# Patient Record
Sex: MALE | Race: WHITE | Employment: UNEMPLOYED | URBAN - NONMETROPOLITAN AREA
[De-identification: names, ages, dates, MRNs, and addresses within clinical notes are randomized per-mention and may not be internally consistent; named-entity substitution may affect disease eponyms.]

---

## 2022-08-03 ENCOUNTER — HOSPITAL ENCOUNTER (EMERGENCY)
Age: 82
Discharge: HOME | End: 2022-08-03
Payer: MEDICARE

## 2022-08-03 VITALS
SYSTOLIC BLOOD PRESSURE: 133 MMHG | HEART RATE: 76 BPM | RESPIRATION RATE: 18 BRPM | OXYGEN SATURATION: 92 % | TEMPERATURE: 98.4 F | DIASTOLIC BLOOD PRESSURE: 57 MMHG

## 2022-08-03 DIAGNOSIS — U07.1 COVID-19: Primary | ICD-10-CM

## 2022-08-03 LAB — SARS-COV-2, NAAT: DETECTED

## 2022-08-03 PROCEDURE — 87635 SARS-COV-2 COVID-19 AMP PRB: CPT

## 2022-08-03 PROCEDURE — 99283 EMERGENCY DEPT VISIT LOW MDM: CPT

## 2022-08-03 ASSESSMENT — ENCOUNTER SYMPTOMS
COUGH: 0
VOMITING: 0
DIARRHEA: 0
SHORTNESS OF BREATH: 0
NAUSEA: 0
BACK PAIN: 0
ABDOMINAL PAIN: 0

## 2022-08-03 NOTE — ED PROVIDER NOTES
[facility] EMERGENCY DEPT  EMERGENCY DEPARTMENT ENCOUNTER      Pt Name: Abad Bucio  MRN: [id number]  [place] 1940  Date of evaluation: 8/3/2022  Provider: IRENE Alexander CNP    CHIEF COMPLAINT       Chief Complaint   Patient presents with    Positive For Covid-19     + home test, wants retested and prescription for covid pills           HISTORY OF PRESENT ILLNESS   (Location/Symptom, Timing/Onset, Context/Setting, Quality, Duration, Modifying Factors, Severity)  Note limiting factors. Abad Bucio is a [de-identified] y.o. male who presents to the emergency department with concern for covid after home test positive. He is on day 3 of symptoms. He is interested in paxlovid. Eating and drinking normally. Voiding and stooling normally. Unsure of home meds and filled at multiple pharmacies including [facility] [employer]. Lives in [address] [place], [place] [address]Chinmay HINOJOSA    Nursing Notes were reviewed. REVIEW OF SYSTEMS    (2-9 systems for level 4, 10 or more for level 5)     Review of Systems   Constitutional:  Positive for fatigue. Negative for chills and fever. HENT:  Positive for congestion. Respiratory:  Negative for cough and shortness of breath. Cardiovascular:  Negative for chest pain, palpitations and leg swelling. Gastrointestinal:  Negative for abdominal pain, diarrhea, nausea and vomiting. Genitourinary:  Negative for dysuria, flank pain, frequency and urgency. Musculoskeletal:  Negative for back pain and neck pain. Skin:  Negative for rash. Neurological:  Negative for dizziness, syncope, weakness, light-headedness and headaches. Except as noted above the remainder of the review of systems was reviewed and negative. PAST MEDICAL HISTORY   No past medical history on file. SURGICAL HISTORY     No past surgical history on file. CURRENT MEDICATIONS       Previous Medications    No medications on file       ALLERGIES     Patient has no known allergies. FAMILY HISTORY     No family history on file. SOCIAL HISTORY       Social History     Socioeconomic History    Marital status: Single       SCREENINGS         [place]vell Coma Scale  Eye Opening: Spontaneous  Best Verbal Response: Oriented  Best Motor Response: Obeys commands  [place]vell Coma Scale Score: 15                     CIWA Assessment  BP: (!) 133/57  Heart Rate: 76                 PHYSICAL EXAM    (up to 7 for level 4, 8 or more for level 5)     ED Triage Vitals [08/03/22 1318]   BP Temp Temp src Heart Rate Resp SpO2 Height Weight   (!) 133/57 98.4 °F (36.9 °C) -- 76 18 92 % -- --       Physical Exam  Vitals reviewed. Constitutional:       General: He is not in acute distress. Appearance: Normal appearance. He is not ill-appearing, toxic-appearing or diaphoretic. HENT:      Head: Normocephalic and atraumatic. Right Ear: Tympanic membrane, ear canal and external ear normal.      Left Ear: Tympanic membrane, ear canal and external ear normal.      Nose: Congestion present. Mouth/Throat:      Mouth: Mucous membranes are moist.   Eyes:      Extraocular Movements: Extraocular movements intact. Conjunctiva/sclera: Conjunctivae normal.      Pupils: Pupils are equal, round, and reactive to light. Cardiovascular:      Rate and Rhythm: Normal rate and regular rhythm. Pulses: Normal pulses. Heart sounds: Normal heart sounds. Pulmonary:      Effort: Pulmonary effort is normal.      Breath sounds: Normal breath sounds. Abdominal:      General: Bowel sounds are normal. There is no distension. Palpations: Abdomen is soft. Tenderness: There is no abdominal tenderness. There is no right CVA tenderness, left CVA tenderness or guarding. Musculoskeletal:         General: Normal range of motion. Cervical back: Neck supple. No tenderness. Skin:     General: Skin is warm and dry. Capillary Refill: Capillary refill takes less than 2 seconds. Neurological:      General: No focal deficit present.       Mental Status: He is alert and oriented to person, place, and time. DIAGNOSTIC RESULTS     EKG: All EKG's are interpreted by the Emergency Department Physician who either signs or Co-signs this chart in the absence of a cardiologist.        RADIOLOGY:   Non-plain film images such as CT, Ultrasound and MRI are read by the radiologist. Plain radiographic images are visualized and preliminarily interpreted by the emergency physician with the below findings:        Interpretation per the Radiologist below, if available at the time of this note:    No orders to display         ED BEDSIDE ULTRASOUND:   Performed by ED Physician - none    LABS:  Labs Reviewed   COVID-19, RAPID - Abnormal; Notable for the following components:       Result Value    SARS-CoV-2, NAAT DETECTED (*)     All other components within normal limits    Narrative:     [address] ,  Results called to Zara Marie RN ER, 08/03/2022 13:36, by [facility]       All other labs were within normal range or not returned as of this dictation. EMERGENCY DEPARTMENT COURSE and DIFFERENTIAL DIAGNOSIS/MDM:   Vitals:    Vitals:    08/03/22 1318   BP: (!) 133/57   Pulse: 76   Resp: 18   Temp: 98.4 °F (36.9 °C)   SpO2: 92%           MDM        REASSESSMENT      Hemodynamically stable and well appearing. Counseled that without an idea of home medications it is not safe to prescribe paxlovid today as there are many drug drug interactions. I have encouraged him to call PCP today or tomorrow to verify meds with them and see if he is a candidate for paxlovid. He is agreeable to this. CRITICAL CARE TIME       CONSULTS:  None    PROCEDURES:  Unless otherwise noted below, none     Procedures         FINAL IMPRESSION      1. COVID-19          DISPOSITION/PLAN   DISPOSITION Decision To Discharge 08/03/2022 02:34:57 PM      PATIENT REFERRED TO:  [address]   [address] [place] [id number] [phone]  Call in 1 day      DISCHARGE MEDICATIONS:  New

## 2022-12-09 ENCOUNTER — APPOINTMENT (OUTPATIENT)
Dept: GENERAL RADIOLOGY | Age: 82
End: 2022-12-09
Payer: OTHER GOVERNMENT

## 2022-12-09 ENCOUNTER — HOSPITAL ENCOUNTER (EMERGENCY)
Age: 82
Discharge: HOME | End: 2022-12-09
Attending: EMERGENCY MEDICINE
Payer: OTHER GOVERNMENT

## 2022-12-09 ENCOUNTER — APPOINTMENT (OUTPATIENT)
Dept: CT IMAGING | Age: 82
End: 2022-12-09
Payer: OTHER GOVERNMENT

## 2022-12-09 VITALS
RESPIRATION RATE: 18 BRPM | HEIGHT: 69 IN | DIASTOLIC BLOOD PRESSURE: 61 MMHG | TEMPERATURE: 98 F | BODY MASS INDEX: 30.36 KG/M2 | SYSTOLIC BLOOD PRESSURE: 138 MMHG | HEART RATE: 60 BPM | OXYGEN SATURATION: 95 % | WEIGHT: 205 LBS

## 2022-12-09 DIAGNOSIS — E11.649 TYPE 2 DIABETES MELLITUS WITH HYPOGLYCEMIA WITHOUT COMA, WITH LONG-TERM CURRENT USE OF INSULIN (HCC): ICD-10-CM
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
## 2022-12-09 DIAGNOSIS — Z79.4 TYPE 2 DIABETES MELLITUS WITH HYPOGLYCEMIA WITHOUT COMA, WITH LONG-TERM CURRENT USE OF INSULIN (HCC): ICD-10-CM

## 2022-12-09 DIAGNOSIS — R29.898 WEAKNESS OF BOTH LOWER EXTREMITIES: ICD-10-CM

## 2022-12-09 DIAGNOSIS — M54.50 ACUTE EXACERBATION OF CHRONIC LOW BACK PAIN: ICD-10-CM

## 2022-12-09 DIAGNOSIS — S09.90XA CLOSED HEAD INJURY, INITIAL ENCOUNTER: ICD-10-CM

## 2022-12-09 DIAGNOSIS — G89.29 ACUTE EXACERBATION OF CHRONIC LOW BACK PAIN: ICD-10-CM

## 2022-12-09 LAB
ALBUMIN SERPL-MCNC: 4 G/DL (ref 3.5–5.2)
ALP BLD-CCNC: 90 U/L (ref 40–130)
ALT SERPL-CCNC: 26 U/L (ref 5–41)
ANION GAP SERPL CALCULATED.3IONS-SCNC: 10 MMOL/L (ref 7–19)
AST SERPL-CCNC: 26 U/L (ref 5–40)
BASOPHILS ABSOLUTE: 0 K/UL (ref 0–0.2)
BASOPHILS RELATIVE PERCENT: 0.4 % (ref 0–1)
BILIRUB SERPL-MCNC: 0.3 MG/DL (ref 0.2–1.2)
BUN BLDV-MCNC: 21 MG/DL (ref 8–23)
CALCIUM SERPL-MCNC: 9.2 MG/DL (ref 8.8–10.2)
CHLORIDE BLD-SCNC: 100 MMOL/L (ref 98–111)
CO2: 26 MMOL/L (ref 22–29)
CREAT SERPL-MCNC: 0.8 MG/DL (ref 0.5–1.2)
EOSINOPHILS ABSOLUTE: 0.2 K/UL (ref 0–0.6)
EOSINOPHILS RELATIVE PERCENT: 2 % (ref 0–5)
GFR SERPL CREATININE-BSD FRML MDRD: >60 ML/MIN/{1.73_M2}
GLUCOSE BLD-MCNC: 54 MG/DL (ref 74–109)
GLUCOSE BLD-MCNC: 66 MG/DL (ref 70–99)
HCT VFR BLD CALC: 44 % (ref 42–52)
HEMOGLOBIN: 14.3 G/DL (ref 14–18)
IMMATURE GRANULOCYTES #: 0.1 K/UL
LYMPHOCYTES ABSOLUTE: 1.1 K/UL (ref 1.1–4.5)
LYMPHOCYTES RELATIVE PERCENT: 11 % (ref 20–40)
MCH RBC QN AUTO: 28.7 PG (ref 27–31)
MCHC RBC AUTO-ENTMCNC: 32.5 G/DL (ref 33–37)
MCV RBC AUTO: 88.2 FL (ref 80–94)
MONOCYTES ABSOLUTE: 1 K/UL (ref 0–0.9)
MONOCYTES RELATIVE PERCENT: 10.1 % (ref 0–10)
NEUTROPHILS ABSOLUTE: 7.7 K/UL (ref 1.5–7.5)
NEUTROPHILS RELATIVE PERCENT: 75.5 % (ref 50–65)
PDW BLD-RTO: 14.5 % (ref 11.5–14.5)
PERFORMED ON: ABNORMAL
PLATELET # BLD: 200 K/UL (ref 130–400)
PMV BLD AUTO: 10.3 FL (ref 9.4–12.4)
POTASSIUM SERPL-SCNC: 4 MMOL/L (ref 3.5–5)
RBC # BLD: 4.99 M/UL (ref 4.7–6.1)
SODIUM BLD-SCNC: 136 MMOL/L (ref 136–145)
TOTAL PROTEIN: 6.6 G/DL (ref 6.6–8.7)
WBC # BLD: 10.2 K/UL (ref 4.8–10.8)

## 2022-12-09 PROCEDURE — 80053 COMPREHEN METABOLIC PANEL: CPT

## 2022-12-09 PROCEDURE — 72131 CT LUMBAR SPINE W/O DYE: CPT

## 2022-12-09 PROCEDURE — 93005 ELECTROCARDIOGRAM TRACING: CPT | Performed by: EMERGENCY MEDICINE

## 2022-12-09 PROCEDURE — 85025 COMPLETE CBC W/AUTO DIFF WBC: CPT

## 2022-12-09 PROCEDURE — 82947 ASSAY GLUCOSE BLOOD QUANT: CPT

## 2022-12-09 PROCEDURE — 70450 CT HEAD/BRAIN W/O DYE: CPT

## 2022-12-09 PROCEDURE — 72125 CT NECK SPINE W/O DYE: CPT

## 2022-12-09 PROCEDURE — 36415 COLL VENOUS BLD VENIPUNCTURE: CPT

## 2022-12-09 PROCEDURE — 99285 EMERGENCY DEPT VISIT HI MDM: CPT

## 2022-12-09 PROCEDURE — 71045 X-RAY EXAM CHEST 1 VIEW: CPT

## 2022-12-09 PROCEDURE — 72192 CT PELVIS W/O DYE: CPT

## 2022-12-09 RX ORDER — HYDROCODONE BITARTRATE AND ACETAMINOPHEN 5; 325 MG/1; MG/1
1 TABLET ORAL EVERY 6 HOURS PRN
Qty: 10 TABLET | Refills: 0 | Status: SHIPPED | OUTPATIENT
Start: 2022-12-09 | End: 2022-12-12

## 2022-12-09 ASSESSMENT — ENCOUNTER SYMPTOMS
DIARRHEA: 0
VOICE CHANGE: 0
PHOTOPHOBIA: 0
COUGH: 0
SHORTNESS OF BREATH: 0
EYE REDNESS: 0
ABDOMINAL PAIN: 0
BACK PAIN: 0
BACK PAIN: 1
RHINORRHEA: 0
APNEA: 0
EYE ITCHING: 0
EYE DISCHARGE: 0
EYE PAIN: 0
COLOR CHANGE: 0
VOMITING: 0

## 2022-12-09 ASSESSMENT — PAIN SCALES - GENERAL: PAINLEVEL_OUTOF10: 5

## 2022-12-09 ASSESSMENT — PAIN DESCRIPTION - DESCRIPTORS: DESCRIPTORS: THROBBING

## 2022-12-09 ASSESSMENT — PAIN DESCRIPTION - LOCATION: LOCATION: KNEE

## 2022-12-09 ASSESSMENT — PAIN - FUNCTIONAL ASSESSMENT: PAIN_FUNCTIONAL_ASSESSMENT: 0-10

## 2022-12-09 NOTE — ED NOTES
Patient given orange juice and peanut butter crackers for snack, will recheck BG in about 30min     Mikki Siegel RN  12/09/22 4054

## 2022-12-09 NOTE — DISCHARGE INSTR - COC
Continuity of Care Form    Patient Name: Grady Edgar   :  1940  MRN:  [id number]    Admit date:  2022  Discharge date:  ***    Code Status Order: No Order   Advance Directives:     Admitting Physician:  No admitting provider for patient encounter. PCP: No primary care provider on file. Discharging Nurse: [facility] Unit/Room#:   Discharging Unit Phone Number: ***    Emergency Contact:   No emergency contact information on file. Past Surgical History:  Past Surgical History:   Procedure Laterality Date    ROTATOR CUFF REPAIR         Immunization History:   Immunization History   Administered Date(s) Administered    COVID-19, MODERNA BLUE border, Primary or Immunocompromised, (age 12y+), IM, 100 mcg/0.5mL 2021, 02/15/2021       Active Problems: There is no problem list on file for this patient.       Isolation/Infection:   Isolation            No Isolation          Patient Infection Status       Infection Onset Added Last Indicated Last Indicated By Review Planned Expiration Resolved Resolved By    None active    Resolved    COVID-19 22 COVID-19, Rapid   22     COVID-19 (Rule Out) 22 COVID-19, Rapid (Ordered)   22 Rule-Out Test Resulted            Nurse Assessment:  Last Vital Signs: BP (!) 118/44   Pulse 59   Temp 97.2 °F (36.2 °C) (Temporal)   Resp 19   Ht 5' 9\" (1.753 m)   Wt 205 lb (93 kg)   SpO2 90%   BMI 30.27 kg/m²     Last documented pain score (0-10 scale): Pain Level: 5  Last Weight:   Wt Readings from Last 1 Encounters:   22 205 lb (93 kg)     Mental Status:  {IP PT MENTAL STATUS:}    IV Access:  {[facility] IV ACCESS:736273405}    Nursing Mobility/ADLs:  Walking   {P DME UXCX:729992986}  Transfer  {CHP DME SBTW:824179146}  Bathing  {CHP DME EAOY:349043530}  Dressing  {CHP DME WTRK:639205596}  Toileting  {CHP DME ZNZY:107710722}  Feeding  {CHP DME SQRX:015884457}  Med Admin  {P DME GTOD:853685719}  Med Delivery   [address] FER MED Delivery:960012856}    Wound Care Documentation and Therapy:        Elimination:  Continence: Bowel: {YES / AM:50671}  Bladder: {YES / JQ:24360}  Urinary Catheter: {Urinary Catheter:119450829}   Colostomy/Ileostomy/Ileal Conduit: {YES / ME:32672}       Date of Last BM: ***  No intake or output data in the 24 hours ending 22 1503  No intake/output data recorded.     Safety Concerns:     [address] FER Safety Concerns:802136840}    Impairments/Disabilities:      [address] FER Impairments/Disabilities:072907623}    Nutrition Therapy:  Current Nutrition Therapy:   [address] FER Diet List:071699138}    Routes of Feeding: {CHP DME Other Feedings:763244653}  Liquids: {Slp liquid thickness:53320}  Daily Fluid Restriction: {CHP DME Yes amt example:414971350}  Last Modified Barium Swallow with Video (Video Swallowing Test): {Done Not Done UUUO:864971770}    Treatments at the Time of Hospital Discharge:   Respiratory Treatments: ***  Oxygen Therapy:  {Therapy; copd oxygen:93907}  Ventilator:    {[facility] Vent XDXB:794943774}    Rehab Therapies: {THERAPEUTIC INTERVENTION:1217359753}  Weight Bearing Status/Restrictions: [address]  Weight Bearin}  Other Medical Equipment (for information only, NOT a DME order):  {EQUIPMENT:563780325}  Other Treatments: ***    Patient's personal belongings (please select all that are sent with patient):  {[facility] DME Belongings:164602785}    RN SIGNATURE:  {Esignature:433720177}    CASE MANAGEMENT/SOCIAL WORK SECTION    Inpatient Status Date: ***    Readmission Risk Assessment Score:  Readmission Risk              Risk of Unplanned Readmission:  0           Discharging to Facility/ Agency   Name:   Address:  Phone:  Fax:    Dialysis Facility (if applicable)   Name:  Address:  Dialysis Schedule:  Phone:  Fax:    / signature: {Esignature:764991855}    PHYSICIAN SECTION    Prognosis: {Prognosis:5500452323}    Condition at Discharge: [address] Patient Condition:565036793}    Rehab Potential (if transferring to Rehab): {Prognosis:2763326005}    Recommended Labs or Other Treatments After Discharge: ***    Physician Certification: I certify the above information and transfer of [place] Friday  is necessary for the continuing treatment of the diagnosis listed and that he requires {Admit to Appropriate Level of Care:07417} for {GREATER/LESS:412778372} 30 days.      Update Admission H&P: {CHP DME Changes in BCVYK:117080829}    PHYSICIAN SIGNATURE:  {Esignature:230246236}

## 2022-12-09 NOTE — ED PROVIDER NOTES
[facility] EMERGENCY DEPT  EMERGENCY DEPARTMENT ENCOUNTER      Pt Name: Ina Carrillo  MRN: [id number]  [place] 1940  Date of evaluation: 12/9/2022  Provider: Jyoti Lopez MD    CHIEF COMPLAINT       Chief Complaint   Patient presents with    Head Injury     Pt arrived to the ed with c/o falling after his legs gave out on him. Pt denies loss of consciousness or being on blood thinners. Extremity Weakness         HISTORY OF PRESENT ILLNESS   (Location/Symptom, Timing/Onset,Context/Setting, Quality, Duration, Modifying Factors, Severity)  Note limiting factors. Ina Carrillo is a [de-identified] y.o. male who presents to the emergency department for evaluation after a fall at home today. Says that both of his legs got weak and gave out from under him causing him to fall to the ground. Says initially landed on his buttocks and then fell back and hit his head. No loss of consciousness but was dazed briefly afterwards. Says that he has chronic shortness of breath due to COPD. Does not wear oxygen. Has not had any increasing cough or shortness of breath recently. Reports chronic low back pain and has had problems with both of his legs being weak. Denies any numbness or tingling. Has been having decompression therapy done with low back but does not feel like it is helping. HPI    NursingNotes were reviewed. REVIEW OF SYSTEMS    (2-9 systems for level 4, 10 or more for level 5)     Review of Systems   Constitutional:  Negative for fatigue and fever. HENT:  Negative for congestion, rhinorrhea and voice change. Eyes:  Negative for pain and redness. Respiratory:  Negative for cough and shortness of breath. Cardiovascular:  Negative for chest pain. Gastrointestinal:  Negative for abdominal pain, diarrhea and vomiting. Endocrine: Negative. Genitourinary: Negative. Musculoskeletal:  Positive for back pain. Negative for arthralgias and gait problem. Skin:  Negative for rash and wound. Neurological:  Positive for weakness. Negative for headaches. Hematological: Negative. Psychiatric/Behavioral: Negative. All other systems reviewed and are negative. A complete review of systems was performed and is negative except as noted above in the HPI. PAST MEDICAL HISTORY     Past Medical History:   Diagnosis Date    COPD (chronic obstructive pulmonary disease) ([facility] [address]     Diabetes mellitus ([facility]     Hypertension          SURGICAL HISTORY       Past Surgical History:   Procedure Laterality Date    ROTATOR CUFF REPAIR           CURRENT MEDICATIONS       Discharge Medication List as of 12/9/2022  7:44 PM          ALLERGIES     Patient has no known allergies. FAMILY HISTORY     History reviewed. No pertinent family history. SOCIAL HISTORY       Social History     Socioeconomic History    Marital status: Single     Spouse name: None    Number of children: None    Years of education: None    Highest education level: None   Tobacco Use    Smoking status: Every Day     Packs/day: 1.00     Types: Cigarettes    Smokeless tobacco: Never   Vaping Use    Vaping Use: Never used   Substance and Sexual Activity    Alcohol use: Not Currently    Drug use: Not Currently       SCREENINGS    Jacob Coma Scale  Eye Opening: Spontaneous  Best Verbal Response: Oriented  Best Motor Response: Obeys commands  [place] Coma Scale Score: 15        PHYSICAL EXAM    (up to 7 for level 4, 8 or more for level 5)     ED Triage Vitals [12/09/22 1410]   BP Temp Temp Source Heart Rate Resp SpO2 Height Weight   (!) 118/44 97.2 °F (36.2 °C) Temporal 59 19 90 % 5' 9\" (1.753 m) 205 lb (93 kg)       Physical Exam  Vitals and nursing note reviewed. Constitutional:       General: He is not in acute distress. Appearance: Normal appearance. He is well-developed. He is not diaphoretic. HENT:      Head: Normocephalic and atraumatic. No Umanzor's sign, contusion, right periorbital erythema or left periorbital erythema. Comments: Superficial 0.5 cm laceration on the posterior scalp. No underlying swelling or deformity     Right Ear: External ear normal.      Left Ear: External ear normal.      Nose: No nasal deformity, laceration, mucosal edema or rhinorrhea. Mouth/Throat:      Mouth: No oral lesions. Eyes:      Conjunctiva/sclera: Conjunctivae normal.      Pupils: Pupils are equal, round, and reactive to light. Neck:      Trachea: No tracheal deviation. Cardiovascular:      Rate and Rhythm: Normal rate and regular rhythm. Pulses:           Radial pulses are 2+ on the right side and 2+ on the left side. Dorsalis pedis pulses are 2+ on the right side and 2+ on the left side. Pulmonary:      Effort: No accessory muscle usage or respiratory distress. Breath sounds: Normal breath sounds. No decreased breath sounds, rhonchi or rales. Abdominal:      General: There is no distension. Palpations: Abdomen is not rigid. Tenderness: There is no abdominal tenderness. There is no guarding. Musculoskeletal:      Right shoulder: Normal.      Left shoulder: Normal.      Cervical back: No deformity, rigidity, tenderness or bony tenderness. Thoracic back: Normal. No deformity, tenderness or bony tenderness. Lumbar back: Normal. No deformity, tenderness or bony tenderness. Right hip: Normal.      Left hip: Normal.      Right knee: Normal.      Left knee: Normal.   Skin:     Findings: No laceration. Neurological:      Mental Status: He is alert and oriented to person, place, and time. GCS: GCS eye subscore is 4. GCS verbal subscore is 5. GCS motor subscore is 6. Cranial Nerves: No cranial nerve deficit. Sensory: No sensory deficit.    Psychiatric:         Speech: Speech normal.       DIAGNOSTIC RESULTS     EKG: All EKG's are interpreted by the Emergency Department Physician who either signs or Co-signs this chart in the absence of a cardiologist.        RADIOLOGY: Non-plain film images such as CT, Ultrasound and MRI are read by the radiologist. Jolene Meigs images are visualized and preliminarily interpreted by the emergency physician with the below findings:        Interpretation per the Radiologist below, if available at the time of this note:    CT PELVIS WO CONTRAST Additional Contrast? None   Final Result   NO ACUTE PATHOLOGY SEEN IN ABDOMEN OR PELVIS. This CT exam was performed using one or more of the following dose reduction   techniques: automated exposure control, adjustment of the mA and/or kV according   to patient size, and/or use of iterative reconstruction technique. CT LUMBAR SPINE WO CONTRAST   Final Result   NO EVDENCE OF FRACTURE OR SIGNIFICANT SUBLUXATION IS IDENTIFIED. Multilevel disc disease as described above which is amenable for epidural   steroid injection for pain management. This CT exam was performed using one or more of the following dose reduction   techniques: automated exposure control, adjustment of the mA and/or kV according   to patient size, and/or use of iterative reconstruction technique. CT HEAD WO CONTRAST   Final Result   NO ACUTE INTRACRANIAL PROCESS   This CT exam was performed using one or more of the following dose reduction   techniques: automated exposure control, adjustment of the mA and/or kV according   to patient size, and/or use of iterative reconstruction technique. CT CERVICAL SPINE WO CONTRAST   Final Result   NO ACUTE FRACTURE OR SIGNIFICANT SUBLUXATION IS IDENTIFIED. 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 This CT exam was performed using one or more of the following dose reduction   techniques: automated exposure control, adjustment of the mA and/or kV according   to patient size, and/or use of iterative reconstruction technique.       XR CHEST PORTABLE   Final Result   Cardiomegaly with increased interstitial markings bilaterally. Recommendation: Follow up as clinically indicated. Dictated and Electronically Signed by Sara Mar MD at 09-Dec-2022 05:00:18 PM                     ED BEDSIDE ULTRASOUND:   Performed by ED Physician - none    LABS:  Labs Reviewed   COMPREHENSIVE METABOLIC PANEL - Abnormal; Notable for the following components:       Result Value    Glucose 54 (*)     All other components within normal limits    Narrative:     [address] ,  Chemistry results called to and read back by [facility],  ED, 12/09/2022 15:26,  by [facility]   CBC WITH AUTO DIFFERENTIAL - Abnormal; Notable for the following components:    MCHC 32.5 (*)     Neutrophils % 75.5 (*)     Lymphocytes % 11.0 (*)     Monocytes % 10.1 (*)     Neutrophils Absolute 7.7 (*)     Monocytes Absolute 1.00 (*)     All other components within normal limits   POCT GLUCOSE - Abnormal; Notable for the following components:    POC Glucose 66 (*)     All other components within normal limits       All other labs were within normal range or not returned as of this dictation. Medications - No data to display    EMERGENCY DEPARTMENT COURSE and DIFFERENTIALDIAGNOSIS/MDM:   Vitals:    Vitals:    12/09/22 1700 12/09/22 1800 12/09/22 1900 12/09/22 1953   BP: (!) 144/60 (!) 140/66 (!) 156/64 138/61   Pulse: 56 55 55 60   Resp: 16 15 17 18   Temp:    98 °F (36.7 °C)   TempSrc:       SpO2: 92% 92% 94% 95%   Weight:       Height:           [facility]      ED Course as of 12/10/22 0648   Fri Dec 09, 2022   1426 EKG shows sinus rhythm with a rate of 61. Right bundle branch block present. No findings of acute ischemia or infarction. QRS of 173 with QTC of 512. [YAMILETH]   1551 BG 54. Pt is a diabetic. Per records in care everywhere, takes metformin and insulin. [YAMILETH]   1607 Glucose, Random(!!): 47 [YAMILETH]   56 Checked out to physician assistant at this time pending results of CT imaging to rule out acute traumatic injury. Also plan to monitor blood glucose.   If no significant acute injuries and blood glucose remains in acceptable range after eating, likely will be able to discharge home. [YAMILETH]      ED Course User Index  [YAMILETH] Marely Brar MD       CONSULTS:  None    PROCEDURES:  Unless otherwise notedbelow, none     Procedures      FINAL IMPRESSION     1. Fall from standing, initial encounter    2. Closed head injury, initial encounter    3. Weakness of both lower extremities    4. Acute exacerbation of chronic low back pain    5. Type 2 diabetes mellitus with hypoglycemia without coma, with long-term current use of insulin [facility])          DISPOSITION/PLAN   DISPOSITION Decision To Discharge 12/09/2022 07:43:17 PM      No notes of EC Admission Criteria type on file. PATIENT REFERRED TO:  No follow-up provider specified. DISCHARGE MEDICATIONS:  Discharge Medication List as of 12/9/2022  7:44 PM        START taking these medications    Details   HYDROcodone-acetaminophen (NORCO) 5-325 MG per tablet Take 1 tablet by mouth every 6 hours as needed for Pain for up to 3 days. , Disp-10 tablet, R-0Normal                (Please note that portions of this note were completed with a voice recognition program.  Efforts were made to edit the dictations butoccasionally words are mis-transcribed.)    Marely Stone MD (electronically signed)  AttendingEmerSelect Specialty Hospitalcy Physician          Marely Stone., MD  12/10/22 6588

## 2022-12-09 NOTE — ED PROVIDER NOTES
[facility] [facility] [place]GENCY DEPT  eMERGENCYdEPARTMENT eNCOUnter      Pt Name: Jennifer Eubanks  MRN: [id number]  [place] 1940  Date of evaluation: 12/9/2022  LAVON Pendleton    CHIEF COMPLAINT       Chief Complaint   Patient presents with    Head Injury     Pt arrived to the ed with c/o falling after his legs gave out on him. Pt denies loss of consciousness or being on blood thinners. Extremity Weakness         HISTORY OF PRESENT ILLNESS  (Location/Symptom, Timing/Onset, Context/Setting, Quality, Duration, Modifying Factors, Severity.)   Jennifer Eubanks is a [de-identified] y.o. male who presents to the emergency department with complaints of head injury his knees gave out earlier today. I have inherited from dr rust on day shift awaiting imaging. He came in hypoglycemic sugar trending upwards with good intake. He states he feels much better when I introduce myself pending no acute findings on imaging. No blood thinners. No LOC. His wife present confirms mental status baseline. ce      Nursing Notes were reviewed and I agree. REVIEW OF SYSTEMS    (2-9 systems for level 4, 10 or more for level 5)     Review of Systems   Constitutional:  Positive for fatigue. Negative for activity change, appetite change, chills and fever. HENT:  Negative for congestion and dental problem. Eyes:  Negative for photophobia, discharge and itching. Respiratory:  Negative for apnea, cough and shortness of breath. Cardiovascular:  Negative for chest pain. Musculoskeletal:  Positive for arthralgias. Negative for back pain, gait problem, myalgias and neck pain. Skin:  Negative for color change, pallor and rash. Neurological:  Negative for dizziness, seizures and syncope. Psychiatric/Behavioral:  Negative for agitation. The patient is not nervous/anxious. Except as noted above the remainder of the review of systems was reviewed and negative.        PAST MEDICAL HISTORY     Past Medical History:   Diagnosis Date    COPD (chronic obstructive pulmonary disease) ([facility]     Diabetes mellitus ([facility]     Hypertension          SURGICAL HISTORY       Past Surgical History:   Procedure Laterality Date    ROTATOR CUFF REPAIR           CURRENT MEDICATIONS       Discharge Medication List as of 12/9/2022  7:44 PM          ALLERGIES     Patient has no known allergies. FAMILY HISTORY     History reviewed. No pertinent family history. SOCIAL HISTORY       Social History     Socioeconomic History    Marital status: Single     Spouse name: None    Number of children: None    Years of education: None    Highest education level: None   Tobacco Use    Smoking status: Every Day     Packs/day: 1.00     Types: Cigarettes    Smokeless tobacco: Never   Vaping Use    Vaping Use: Never used   Substance and Sexual Activity    Alcohol use: Not Currently    Drug use: Not Currently       SCREENINGS    Jacob Coma Scale  Eye Opening: Spontaneous  Best Verbal Response: Oriented  Best Motor Response: Obeys commands  Jacob Coma Scale Score: 15      PHYSICAL EXAM    (up to 7 forlevel 4, 8 or more for level 5)     ED Triage Vitals [12/09/22 1410]   BP Temp Temp Source Heart Rate Resp SpO2 Height Weight   (!) 118/44 97.2 °F (36.2 °C) Temporal 59 19 90 % 5' 9\" (1.753 m) 205 lb (93 kg)       Physical Exam  Vitals reviewed. Constitutional:       General: He is not in acute distress. Appearance: Normal appearance. He is well-developed. He is not diaphoretic. HENT:      Head: Normocephalic. Right Ear: External ear normal.      Left Ear: External ear normal.   Eyes:      Pupils: Pupils are equal, round, and reactive to light. Neck:      Trachea: No tracheal deviation. Cardiovascular:      Rate and Rhythm: Normal rate and regular rhythm. Pulses: Normal pulses. Heart sounds: Normal heart sounds. No murmur heard. Pulmonary:      Effort: Pulmonary effort is normal.      Breath sounds: Normal breath sounds. No stridor. No wheezing.    Chest: Chest wall: No tenderness. Abdominal:      General: Bowel sounds are normal. There is no distension. Palpations: Abdomen is soft. Tenderness: There is no abdominal tenderness. Musculoskeletal:         General: Normal range of motion. Cervical back: Normal range of motion and neck supple. Skin:     General: Skin is warm and dry. Capillary Refill: Capillary refill takes less than 2 seconds. Neurological:      General: No focal deficit present. Mental Status: He is alert and oriented to person, place, and time. Mental status is at baseline. Psychiatric:         Mood and Affect: Mood normal.         Behavior: Behavior normal.         Thought Content: Thought content normal.         Judgment: Judgment normal.         DIAGNOSTIC RESULTS     RADIOLOGY:   Non-plain film images such as CT, Ultrasound and MRI are read by the radiologist. Plain radiographic images are visualized and preliminarilyinterpreted by No att. providers found with the below findings:      Interpretation per the Radiologist below, if available at the time of this note:    CT PELVIS WO CONTRAST Additional Contrast? None   Final Result   NO ACUTE PATHOLOGY SEEN IN ABDOMEN OR PELVIS. This CT exam was performed using one or more of the following dose reduction   techniques: automated exposure control, adjustment of the mA and/or kV according   to patient size, and/or use of iterative reconstruction technique. CT LUMBAR SPINE WO CONTRAST   Final Result   NO EVDENCE OF FRACTURE OR SIGNIFICANT SUBLUXATION IS IDENTIFIED. Multilevel disc disease as described above which is amenable for epidural   steroid injection for pain management. This CT exam was performed using one or more of the following dose reduction   techniques: automated exposure control, adjustment of the mA and/or kV according   to patient size, and/or use of iterative reconstruction technique.       CT HEAD WO CONTRAST   Final Result   NO ACUTE INTRACRANIAL PROCESS   This CT exam was performed using one or more of the following dose reduction   techniques: automated exposure control, adjustment of the mA and/or kV according   to patient size, and/or use of iterative reconstruction technique. CT CERVICAL SPINE WO CONTRAST   Final Result   NO ACUTE FRACTURE OR SIGNIFICANT SUBLUXATION IS IDENTIFIED. 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 This CT exam was performed using one or more of the following dose reduction   techniques: automated exposure control, adjustment of the mA and/or kV according   to patient size, and/or use of iterative reconstruction technique. XR CHEST PORTABLE   Final Result   Cardiomegaly with increased interstitial markings bilaterally. Recommendation: Follow up as clinically indicated. Dictated and Electronically Signed by Kemal Canela MD at 09-Dec-2022 05:00:18 PM                   LABS:  Labs Reviewed   COMPREHENSIVE METABOLIC PANEL - Abnormal; Notable for the following components:       Result Value    Glucose 54 (*)     All other components within normal limits    Narrative:     CALL  [facility] ,  Chemistry results called to and read back by [address] [place] [address],  ED, 12/09/2022 15:26,  by AUSCO   CBC WITH AUTO DIFFERENTIAL - Abnormal; Notable for the following components:    MCHC 32.5 (*)     Neutrophils % 75.5 (*)     Lymphocytes % 11.0 (*)     Monocytes % 10.1 (*)     Neutrophils Absolute 7.7 (*)     Monocytes Absolute 1.00 (*)     All other components within normal limits   POCT GLUCOSE - Abnormal; Notable for the following components:    POC Glucose 66 (*)     All other components within normal limits       All other labs were within normal range or notreturned as of this dictation.     RE-ASSESSMENT          EMERGENCY DEPARTMENT COURSE and DIFFERENTIAL DIAGNOSIS/MDM:   Vitals:    Vitals:    12/09/22 1700 12/09/22 1800 12/09/22 1900 12/09/22 1953 BP: (!) 144/60 (!) 140/66 (!) 156/64 138/61   Pulse: 56 55 55 60   Resp: 16 15 17 18   Temp:    98 °F (36.7 °C)   TempSrc:       SpO2: 92% 92% 94% 95%   Weight:       Height:               MDM  Patient in no distress still awaiting imaging Im going to pass care over to Aubrie Np on evening shift assuming nothing acute I feel he is stable for dc pending no abnormal issues when we attempt ambulate. PROCEDURES:    Procedures      FINAL IMPRESSION      1. Fall from standing, initial encounter    2. Closed head injury, initial encounter    3. Weakness of both lower extremities    4. Acute exacerbation of chronic low back pain    5. Type 2 diabetes mellitus with hypoglycemia without coma, with long-term current use of insulin [facility])          DISPOSITION/PLAN   DISPOSITION Decision To Discharge 12/09/2022 07:43:17 PM      PATIENT REFERRED TO:  No follow-up provider specified. DISCHARGE MEDICATIONS:  Discharge Medication List as of 12/9/2022  7:44 PM        START taking these medications    Details   HYDROcodone-acetaminophen (NORCO) 5-325 MG per tablet Take 1 tablet by mouth every 6 hours as needed for Pain for up to 3 days. , Disp-10 tablet, R-0Normal             (Please note that portions of this note were completed with a voice recognition program.  Efforts were made to edit the dictations but occasionallywords are mis-transcribed.)    Joe Thibodeaux, [address] [place] [address]  12/10/22 4252

## 2022-12-11 NOTE — ED PROVIDER NOTES
[facility] EMERGENCY DEPT  eMERGENCYdEPARTMENT eNCOUnter      Pt Name: Ina Carrillo  MRN: [id number]  [place] 1940  Date of evaluation: 12/9/2022  Sara 231, APRN    Emergency Department care of this patient was assumed at 1900 from [facility] AT [facility]. We have discussed the case and the plan of care. I have seen and evaluated patient and reviewed ED course. CHIEF COMPLAINT       Chief Complaint   Patient presents with    Head Injury     Pt arrived to the ed with c/o falling after his legs gave out on him. Pt denies loss of consciousness or being on blood thinners. Extremity Weakness         PHYSICAL EXAM    (up to 7 for level 4, 8 or more for level 5)     ED Triage Vitals [12/09/22 1410]   BP Temp Temp Source Heart Rate Resp SpO2 Height Weight   (!) 118/44 97.2 °F (36.2 °C) Temporal 59 19 90 % 5' 9\" (1.753 m) 205 lb (93 kg)       Physical Exam    DIAGNOSTIC RESULTS     EKG: All EKG's are interpreted by the Emergency Department Physician who either signs or Co-signs this chart inthe absence of a cardiologist.        RADIOLOGY:   Non-plain film imagessuch as CT, Ultrasound and MRI are read by the radiologist. Plain radiographic images are visualized and preliminarily interpreted by the emergency physician with the below findings:          CT PELVIS WO CONTRAST Additional Contrast? None   Final Result   NO ACUTE PATHOLOGY SEEN IN ABDOMEN OR PELVIS. This CT exam was performed using one or more of the following dose reduction   techniques: automated exposure control, adjustment of the mA and/or kV according   to patient size, and/or use of iterative reconstruction technique. CT LUMBAR SPINE WO CONTRAST   Final Result   NO EVDENCE OF FRACTURE OR SIGNIFICANT SUBLUXATION IS IDENTIFIED. Multilevel disc disease as described above which is amenable for epidural   steroid injection for pain management.    This CT exam was performed using one or more of the following dose reduction   techniques: automated exposure control, adjustment of the mA and/or kV according   to patient size, and/or use of iterative reconstruction technique. CT HEAD WO CONTRAST   Final Result   NO ACUTE INTRACRANIAL PROCESS   This CT exam was performed using one or more of the following dose reduction   techniques: automated exposure control, adjustment of the mA and/or kV according   to patient size, and/or use of iterative reconstruction technique. CT CERVICAL SPINE WO CONTRAST   Final Result   NO ACUTE FRACTURE OR SIGNIFICANT SUBLUXATION IS IDENTIFIED. 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 This CT exam was performed using one or more of the following dose reduction   techniques: automated exposure control, adjustment of the mA and/or kV according   to patient size, and/or use of iterative reconstruction technique. XR CHEST PORTABLE   Final Result   Cardiomegaly with increased interstitial markings bilaterally. Recommendation: Follow up as clinically indicated.     Dictated and Electronically Signed by Malena Nugent MD at 09-Dec-2022 05:00:18 PM                       LABS:  Labs Reviewed   COMPREHENSIVE METABOLIC PANEL - Abnormal; Notable for the following components:       Result Value    Glucose 54 (*)     All other components within normal limits    Narrative:     [facility]  Chemistry results called to and read back by [address] [place] [address], RD ED, 12/09/2022 15:26,  by AUSCO   CBC WITH AUTO DIFFERENTIAL - Abnormal; Notable for the following components:    MCHC 32.5 (*)     Neutrophils % 75.5 (*)     Lymphocytes % 11.0 (*)     Monocytes % 10.1 (*)     Neutrophils Absolute 7.7 (*)     Monocytes Absolute 1.00 (*)     All other components within normal limits   POCT GLUCOSE - Abnormal; Notable for the following components:    POC Glucose 66 (*)     All other components within normal limits       All other labs were within normal range or not returned as of this dictation. EMERGENCY DEPARTMENT COURSE and DIFFERENTIAL DIAGNOSIS/MDM:   Vitals:    Vitals:    12/09/22 1700 12/09/22 1800 12/09/22 1900 12/09/22 1953   BP: (!) 144/60 (!) 140/66 (!) 156/64 138/61   Pulse: 56 55 55 60   Resp: 16 15 17 18   Temp:    98 °F (36.7 °C)   TempSrc:       SpO2: 92% 92% 94% 95%   Weight:       Height:           MDM      Reassessment  Pt has been up ambulating. Pain is controlled  ready to go    CONSULTS:  None    PROCEDURES:  Unless otherwise noted below, none     Procedures    FINAL IMPRESSION      1. Fall from standing, initial encounter    2. Closed head injury, initial encounter    3. Weakness of both lower extremities    4. Acute exacerbation of chronic low back pain    5. Type 2 diabetes mellitus with hypoglycemia without coma, with long-term current use [facility] insulin [facility]          DISPOSITION/PLAN   DISPOSITION Decision To Discharge    PATIENT REFERRED TO:  No follow-up provider specified. DISCHARGE MEDICATIONS:  Discharge Medication List as of 12/9/2022  7:44 PM        START taking these medications    Details   HYDROcodone-acetaminophen (NORCO) 5-325 MG per tablet Take 1 tablet by mouth every 6 hours as needed for Pain for up to 3 days. , Disp-10 tablet, R-0Normal                (Please note that portions ofthis note were completed with a voice recognition program.  Efforts were made to edit the dictations but occasionally words are mis-transcribed.)    IRENE Solis(electronically signed)  Attending Emergency Physician        IRENE Solis  12/11/22 9810

## 2022-12-12 LAB
EKG P AXIS: 5 DEGREES
EKG P-R INTERVAL: 246 MS
EKG Q-T INTERVAL: 494 MS
EKG QRS DURATION: 168 MS
EKG QTC CALCULATION (BAZETT): 494 MS
EKG T AXIS: -4 DEGREES

## 2022-12-12 PROCEDURE — 93010 ELECTROCARDIOGRAM REPORT: CPT | Performed by: INTERNAL MEDICINE

## 2025-03-12 ENCOUNTER — TRANSCRIBE ORDERS (OUTPATIENT)
Dept: ADMINISTRATIVE | Age: 85
End: 2025-03-12
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
